# Patient Record
Sex: FEMALE | Race: WHITE | NOT HISPANIC OR LATINO | Employment: OTHER | URBAN - METROPOLITAN AREA
[De-identification: names, ages, dates, MRNs, and addresses within clinical notes are randomized per-mention and may not be internally consistent; named-entity substitution may affect disease eponyms.]

---

## 2017-05-18 ENCOUNTER — COMMUNICATION - HEALTHEAST (OUTPATIENT)
Dept: INTERNAL MEDICINE | Facility: CLINIC | Age: 62
End: 2017-05-18

## 2017-05-23 ENCOUNTER — HOSPITAL ENCOUNTER (OUTPATIENT)
Dept: MAMMOGRAPHY | Facility: CLINIC | Age: 62
Discharge: HOME | End: 2017-05-23
Attending: INTERNAL MEDICINE

## 2017-05-23 DIAGNOSIS — Z12.31 VISIT FOR SCREENING MAMMOGRAM: ICD-10-CM

## 2017-06-01 ENCOUNTER — OFFICE VISIT - HEALTHEAST (OUTPATIENT)
Dept: INTERNAL MEDICINE | Facility: CLINIC | Age: 62
End: 2017-06-01

## 2017-06-01 DIAGNOSIS — Z01.810 PREOP CARDIOVASCULAR EXAM: ICD-10-CM

## 2017-06-01 DIAGNOSIS — M21.619 BUNION: ICD-10-CM

## 2017-06-01 DIAGNOSIS — D68.51 FACTOR V LEIDEN CARRIER (H): ICD-10-CM

## 2017-06-01 ASSESSMENT — MIFFLIN-ST. JEOR: SCORE: 1371.71

## 2017-06-06 LAB
ATRIAL RATE - MUSE: 61 BPM
DIASTOLIC BLOOD PRESSURE - MUSE: NORMAL MMHG
INTERPRETATION ECG - MUSE: NORMAL
P AXIS - MUSE: 54 DEGREES
PR INTERVAL - MUSE: 146 MS
QRS DURATION - MUSE: 88 MS
QT - MUSE: 434 MS
QTC - MUSE: 436 MS
R AXIS - MUSE: 72 DEGREES
SYSTOLIC BLOOD PRESSURE - MUSE: NORMAL MMHG
T AXIS - MUSE: 42 DEGREES
VENTRICULAR RATE- MUSE: 61 BPM

## 2017-08-24 ENCOUNTER — RECORDS - HEALTHEAST (OUTPATIENT)
Dept: ADMINISTRATIVE | Facility: OTHER | Age: 62
End: 2017-08-24

## 2017-09-13 ENCOUNTER — OFFICE VISIT - HEALTHEAST (OUTPATIENT)
Dept: INTERNAL MEDICINE | Facility: CLINIC | Age: 62
End: 2017-09-13

## 2017-09-13 DIAGNOSIS — Z13.220 SCREENING FOR CHOLESTEROL LEVEL: ICD-10-CM

## 2017-09-13 DIAGNOSIS — Z00.00 ROUTINE GENERAL MEDICAL EXAMINATION AT A HEALTH CARE FACILITY: ICD-10-CM

## 2017-09-13 DIAGNOSIS — E66.3 OVERWEIGHT (BMI 25.0-29.9): ICD-10-CM

## 2017-09-13 DIAGNOSIS — E04.1 RIGHT THYROID NODULE: ICD-10-CM

## 2017-09-13 LAB
CHOLEST SERPL-MCNC: 198 MG/DL
FASTING STATUS PATIENT QL REPORTED: YES
HDLC SERPL-MCNC: 73 MG/DL
LDLC SERPL CALC-MCNC: 114 MG/DL
TRIGL SERPL-MCNC: 55 MG/DL

## 2017-09-13 RX ORDER — TRIAMCINOLONE ACETONIDE 1 MG/G
OINTMENT TOPICAL 2 TIMES DAILY
Status: SHIPPED | COMMUNITY
Start: 2017-09-13

## 2017-09-13 ASSESSMENT — MIFFLIN-ST. JEOR: SCORE: 1389.57

## 2017-09-18 ENCOUNTER — AMBULATORY - HEALTHEAST (OUTPATIENT)
Dept: INTERNAL MEDICINE | Facility: CLINIC | Age: 62
End: 2017-09-18

## 2017-09-18 ENCOUNTER — HOSPITAL ENCOUNTER (OUTPATIENT)
Dept: ULTRASOUND IMAGING | Facility: CLINIC | Age: 62
Discharge: HOME | End: 2017-09-18
Attending: INTERNAL MEDICINE

## 2017-09-18 DIAGNOSIS — E04.1 RIGHT THYROID NODULE: ICD-10-CM

## 2017-09-19 ENCOUNTER — COMMUNICATION - HEALTHEAST (OUTPATIENT)
Dept: INTERNAL MEDICINE | Facility: CLINIC | Age: 62
End: 2017-09-19

## 2017-09-19 ENCOUNTER — RECORDS - HEALTHEAST (OUTPATIENT)
Dept: ADMINISTRATIVE | Facility: OTHER | Age: 62
End: 2017-09-19

## 2017-09-20 ENCOUNTER — RECORDS - HEALTHEAST (OUTPATIENT)
Dept: ADMINISTRATIVE | Facility: OTHER | Age: 62
End: 2017-09-20

## 2017-09-28 ENCOUNTER — HOSPITAL ENCOUNTER (OUTPATIENT)
Dept: ULTRASOUND IMAGING | Facility: CLINIC | Age: 62
Discharge: HOME | End: 2017-09-28
Attending: INTERNAL MEDICINE

## 2017-09-28 DIAGNOSIS — E04.1 RIGHT THYROID NODULE: ICD-10-CM

## 2017-09-29 LAB
LAB AP CHARGES (HE HISTORICAL CONVERSION): NORMAL
LAB AP INITIAL CYTO EVAL (HE HISTORICAL CONVERSION): NORMAL
LAB MED GENERAL PATH INTERP (HE HISTORICAL CONVERSION): NORMAL
PATH REPORT.COMMENTS IMP SPEC: NORMAL
PATH REPORT.COMMENTS IMP SPEC: NORMAL
PATH REPORT.FINAL DX SPEC: NORMAL
PATH REPORT.MICROSCOPIC SPEC OTHER STN: NORMAL
PATH REPORT.RELEVANT HX SPEC: NORMAL
RESULT FLAG (HE HISTORICAL CONVERSION): NORMAL
SPECIMEN DESCRIPTION: NORMAL

## 2017-10-18 ENCOUNTER — OFFICE VISIT - HEALTHEAST (OUTPATIENT)
Dept: INTERNAL MEDICINE | Facility: CLINIC | Age: 62
End: 2017-10-18

## 2017-10-18 ENCOUNTER — COMMUNICATION - HEALTHEAST (OUTPATIENT)
Dept: SCHEDULING | Facility: CLINIC | Age: 62
End: 2017-10-18

## 2017-10-18 DIAGNOSIS — I49.9 IRREGULAR HEART RHYTHM: ICD-10-CM

## 2017-10-18 LAB
ATRIAL RATE - MUSE: 65 BPM
DIASTOLIC BLOOD PRESSURE - MUSE: NORMAL MMHG
INTERPRETATION ECG - MUSE: NORMAL
P AXIS - MUSE: 22 DEGREES
PR INTERVAL - MUSE: 134 MS
QRS DURATION - MUSE: 90 MS
QT - MUSE: 426 MS
QTC - MUSE: 443 MS
R AXIS - MUSE: 62 DEGREES
SYSTOLIC BLOOD PRESSURE - MUSE: NORMAL MMHG
T AXIS - MUSE: 29 DEGREES
VENTRICULAR RATE- MUSE: 65 BPM

## 2017-10-24 ENCOUNTER — HOSPITAL ENCOUNTER (OUTPATIENT)
Dept: CARDIOLOGY | Facility: CLINIC | Age: 62
Discharge: HOME | End: 2017-10-24

## 2017-10-24 DIAGNOSIS — I49.9 IRREGULAR HEART RHYTHM: ICD-10-CM

## 2018-03-26 ENCOUNTER — RECORDS - HEALTHEAST (OUTPATIENT)
Dept: ADMINISTRATIVE | Facility: OTHER | Age: 63
End: 2018-03-26

## 2018-04-05 ENCOUNTER — RECORDS - HEALTHEAST (OUTPATIENT)
Dept: ADMINISTRATIVE | Facility: OTHER | Age: 63
End: 2018-04-05

## 2018-05-15 ENCOUNTER — OFFICE VISIT - HEALTHEAST (OUTPATIENT)
Dept: INTERNAL MEDICINE | Facility: CLINIC | Age: 63
End: 2018-05-15

## 2018-05-15 DIAGNOSIS — M21.619 BUNION: ICD-10-CM

## 2018-05-15 DIAGNOSIS — D68.51 FACTOR V LEIDEN CARRIER (H): ICD-10-CM

## 2018-05-15 DIAGNOSIS — Z01.818 PREOP EXAMINATION: ICD-10-CM

## 2018-05-15 LAB
ATRIAL RATE - MUSE: 53 BPM
DIASTOLIC BLOOD PRESSURE - MUSE: NORMAL MMHG
INTERPRETATION ECG - MUSE: NORMAL
P AXIS - MUSE: 55 DEGREES
PR INTERVAL - MUSE: 144 MS
QRS DURATION - MUSE: 88 MS
QT - MUSE: 438 MS
QTC - MUSE: 410 MS
R AXIS - MUSE: 68 DEGREES
SYSTOLIC BLOOD PRESSURE - MUSE: NORMAL MMHG
T AXIS - MUSE: 41 DEGREES
VENTRICULAR RATE- MUSE: 53 BPM

## 2018-06-04 ENCOUNTER — HOSPITAL ENCOUNTER (OUTPATIENT)
Dept: MAMMOGRAPHY | Facility: CLINIC | Age: 63
Discharge: HOME | End: 2018-06-04
Attending: INTERNAL MEDICINE

## 2018-06-04 DIAGNOSIS — Z12.31 VISIT FOR SCREENING MAMMOGRAM: ICD-10-CM

## 2018-06-26 ENCOUNTER — RECORDS - HEALTHEAST (OUTPATIENT)
Dept: ADMINISTRATIVE | Facility: OTHER | Age: 63
End: 2018-06-26

## 2019-06-26 ENCOUNTER — RECORDS - HEALTHEAST (OUTPATIENT)
Dept: ADMINISTRATIVE | Facility: OTHER | Age: 64
End: 2019-06-26

## 2019-08-14 ENCOUNTER — HOSPITAL ENCOUNTER (OUTPATIENT)
Dept: MAMMOGRAPHY | Facility: CLINIC | Age: 64
Discharge: HOME | End: 2019-08-14
Attending: OBSTETRICS & GYNECOLOGY

## 2019-08-14 DIAGNOSIS — Z12.31 VISIT FOR SCREENING MAMMOGRAM: ICD-10-CM

## 2019-09-04 ENCOUNTER — RECORDS - HEALTHEAST (OUTPATIENT)
Dept: BONE DENSITY | Facility: CLINIC | Age: 64
End: 2019-09-04

## 2019-09-04 ENCOUNTER — RECORDS - HEALTHEAST (OUTPATIENT)
Dept: ADMINISTRATIVE | Facility: OTHER | Age: 64
End: 2019-09-04

## 2019-09-04 DIAGNOSIS — Z78.0 ASYMPTOMATIC MENOPAUSAL STATE: ICD-10-CM

## 2020-10-08 ENCOUNTER — HOSPITAL ENCOUNTER (OUTPATIENT)
Dept: MAMMOGRAPHY | Facility: CLINIC | Age: 65
Discharge: HOME | End: 2020-10-08
Attending: OBSTETRICS & GYNECOLOGY

## 2020-10-08 DIAGNOSIS — Z12.31 VISIT FOR SCREENING MAMMOGRAM: ICD-10-CM

## 2021-05-25 ENCOUNTER — RECORDS - HEALTHEAST (OUTPATIENT)
Dept: ADMINISTRATIVE | Facility: CLINIC | Age: 66
End: 2021-05-25

## 2021-05-26 ENCOUNTER — RECORDS - HEALTHEAST (OUTPATIENT)
Dept: ADMINISTRATIVE | Facility: CLINIC | Age: 66
End: 2021-05-26

## 2021-05-27 ENCOUNTER — RECORDS - HEALTHEAST (OUTPATIENT)
Dept: ADMINISTRATIVE | Facility: CLINIC | Age: 66
End: 2021-05-27

## 2021-05-28 ENCOUNTER — RECORDS - HEALTHEAST (OUTPATIENT)
Dept: ADMINISTRATIVE | Facility: CLINIC | Age: 66
End: 2021-05-28

## 2021-05-29 ENCOUNTER — RECORDS - HEALTHEAST (OUTPATIENT)
Dept: ADMINISTRATIVE | Facility: CLINIC | Age: 66
End: 2021-05-29

## 2021-05-30 ENCOUNTER — RECORDS - HEALTHEAST (OUTPATIENT)
Dept: ADMINISTRATIVE | Facility: CLINIC | Age: 66
End: 2021-05-30

## 2021-05-31 VITALS — WEIGHT: 179 LBS | BODY MASS INDEX: 28.04 KG/M2

## 2021-05-31 VITALS — BODY MASS INDEX: 28.09 KG/M2 | HEIGHT: 67 IN | WEIGHT: 179 LBS

## 2021-05-31 VITALS — BODY MASS INDEX: 27.48 KG/M2 | WEIGHT: 175.06 LBS | HEIGHT: 67 IN

## 2021-06-01 VITALS — BODY MASS INDEX: 26.47 KG/M2 | WEIGHT: 169 LBS

## 2021-06-11 NOTE — PROGRESS NOTES
Assessment/Plan:      Visit for Preoperative Exam.     Patient approved for surgery with general or local anesthesia.     Subjective:     Scheduled Procedure: Bilateral bunion repair  Surgery Date:  6/5/17  Surgery Location:  [facility]  Surgeon:  Dr. Reeder    ASSESSED PROBLEMS:  Problem List Items Addressed This Visit     None      Visit Diagnoses     Preop cardiovascular exam    -  Primary    Relevant Orders    Electrocardiogram Perform and Read (Completed)          CHIEF COMPLAINT:  Chief Complaint   Patient presents with     Pre-op Exam       HISTORY OF PRESENT ILLNESS:  Coleen Cabrera is a 61 y.o. female here for an internal medicine pre-operative consultation. The exam is requested by Dr. Reeder  in preparation for Bilateral Bunion Repair to be performed at [facility] on 6/5/17. Today s examination on 6/1/17 is done to review the underlying surgical condition of Bunions, clear for anesthesia, and review medical problems with appropriate changes in medications. The bunions are only painful if she is on her feet a lot or is playing tennis. She's had them for at least two years.  She has had various friends that have gotten the surgery with this surgeon.    Coleen Cabrera has tolerated previous surgeries well without bleeding or anesthesia difficulty. She had gallbladder surgery with no complications. She has had an appendectomy. She denies any blood clots with any of her surgeries.  .............................................................................................................................................  Current Outpatient Prescriptions   Medication Sig Dispense Refill     aspirin 81 MG EC tablet Take 81 mg by mouth daily.       b complex vitamins tablet Take 1 tablet by mouth daily.       cholecalciferol, vitamin D3, (VITAMIN D3) 2,000 unit cap Take by mouth.       FOLIC ACID ORAL Take 2 mg by mouth.       pyridoxine (VITAMIN B-6) 50 MG tablet Take 50 mg by mouth  daily.       HYDROcodone-acetaminophen 5-325 mg per tablet Take 1-2 tablets by mouth every 4 (four) hours as needed for pain. 25 tablet 0     No current facility-administered medications for this visit.        No Known Allergies    Immunization History   Administered Date(s) Administered     Td, historic 1955     Tdap 11/14/2013       Patient Active Problem List   Diagnosis     Factor V Leiden carrier     Right nephrolithiasis       No past medical history on file.    Social History     Social History     Marital status:      Spouse name: N/A     Number of children: N/A     Years of education: N/A     Occupational History     book keeping      Part time     Social History Main Topics     Smoking status: Never Smoker     Smokeless tobacco: Never Used     Alcohol use 1.5 oz/week     3 Standard drinks or equivalent per week     Drug use: Not on file     Sexual activity: Not on file     Other Topics Concern     Not on file     Social History Narrative       Past Surgical History:   Procedure Laterality Date     APPENDECTOMY  7/27/1981     HYSTEROSCOPY  10/5/2010     KIDNEY STONE SURGERY  2012    Stent, [facility]         History of Present Illness  Recent Health  Fever: no  Chills: no  Fatigue: no  Chest Pain: no  Cough: no  Dyspnea: no  Urinary Frequency: no  Nausea: no  Vomiting: no  Diarrhea: no  Abdominal Pain: no  Easy Bruising: no  Lower Extremity Swelling: no  Poor Exercise Tolerance: no    Most recent Health Maintenance Visit:  1 year(s) ago    Pertinent History  Prior Anesthesia: yes  Previous Anesthesia Reaction:  no  Diabetes: no  Cardiovascular Disease: no  Pulmonary Disease: no  Renal Disease: no  GI Disease: no  Sleep Apnea: no  Thromboembolic Problems: no  Clotting Disorder: no  Bleeding Disorder: no  Transfusion Reaction: no  Impaired Immunity: no  Steroid use in the last 6 months: yes, psoriasis cream   Frequent Aspirin use: yes, Stopped already    Family history of Stroke    Social  history of patient does not wear denture or partial plates, there is no transfusion refusal and there are no concerns regarding care after surgery    After surgery, the patient plans to recover at home with family.    Review of Systems  She has had a colonoscopy. She denies chest discomfort or shortness of breath even with activity. She is not currently taking any of her vitamin supplements. She feels well overall. Pertinent items are noted in HPI. A 12 point comprehensive review of systems was negative except as noted.      Objective:      Vitals:    06/01/17 1112   BP: 128/72   Pulse: 60   SpO2: 100%        Physical Exam:  General Appearance: Alert, cooperative, no distress, appears stated age   Head: Normocephalic, without obvious abnormality, atraumatic   Eyes: PERRL, conjunctiva/corneas clear, EOM's intact   Throat: Lips, mucosa, and tongue normal; teeth and gums normal   Neck: Normal ROM   Lungs: Clear to auscultation bilaterally, respirations unlabored.   Heart: Regular rate and rhythm, S1 and S2 normal, no murmur, rub, or gallop,   Abdomen: Soft, non-tender, bowel sounds active all four quadrants, no masses, no organomegaly   Extremities: Extremities normal, atraumatic, no cyanosis or edema, bunion deformities bilaterally   Skin: Skin color, texture, turgor normal, no rashes or lesions   Neurologic: Normal     EKG 6/1/17: Normal sinus rhythm, rate 61,     ADDITIONAL HISTORY SUMMARIZED (2): Reviewed note from 7/14/15 regarding shortness of breath upon exertion.   DECISION TO OBTAIN EXTRA INFORMATION (1): None.   RADIOLOGY TESTS (1): None.  LABS (1): Reviewed labs from 7/14/15.   MEDICINE TESTS (1): Ordered EKG.  INDEPENDENT REVIEW (2 each): Reviewed EKG from 6/1/17.     The visit lasted a total of 10 minutes face to face with the patient. Over 50% of the time was spent counseling and educating the patient about bunions.    ILaura, am scribing for and in the presence of, Juana Urrutia MD.    I,  Juana Urrutia MD, personally performed the services described in this documentation, as scribed by Laura Ro in my presence, and it is both accurate and complete.    Total Points: 6

## 2021-06-12 NOTE — PROGRESS NOTES
ASSESSMENT and PLAN:  1. Right thyroid nodule  We will check a thyroid function today as well as order a thyroid ultrasound.  - US Thyroid; Future  - Thyroid [place]    2. Screening for cholesterol level  Labs today.  - Lipid Cascade    3. Overweight (BMI 25.0-29.9)  Will get a conference of metabolic panel as part of her physical given that she is overweight.  I am checking a glucose with this.  - Comprehensive Metabolic Panel    4.  Annual exam  Her cancer screenings are up-to-date.      Patient Instructions   Please set up your thyroid ultrasound:  [phone]    Today's labs will be available on [employer].  If you aren't signed up, then we'll mail them out.  If anything needs more immediate follow up, we'll also call.    Take care!      Orders Placed This Encounter   Procedures     US Thyroid     Standing Status:   Future     Number of Occurrences:   1     Standing Expiration Date:   9/13/2018     Order Specific Question:   Reason for Exam (Describe Symptoms):     Answer:   ?right sided enlargement     Order Specific Question:   Can the procedure be changed per Radiologist protocol?     Answer:   Yes     Influenza, Seasonal,Quad Inj, 36+ MOS     Thyroid [place]     Comprehensive Metabolic Panel     Lipid Cascade     Order Specific Question:   Fasting is required?     Answer:   Yes     Medications Discontinued During This Encounter   Medication Reason     HYDROcodone-acetaminophen 5-325 mg per tablet Therapy completed       No Follow-up on file.    ASSESSED PROBLEMS:  Problem List Items Addressed This Visit     None      Visit Diagnoses     Routine general medical examination at a health care facility    -  Primary    Right thyroid nodule        Relevant Orders    US Thyroid (Completed)    Thyroid Cascade (Completed)    Screening for cholesterol level        Relevant Orders    Lipid Cascade (Completed)    Overweight (BMI 25.0-29.9)        Relevant Orders    Comprehensive Metabolic Panel (Completed)          CHIEF  COMPLAINT:  Chief Complaint   Patient presents with     Annual Exam     pt is fasting, no pap/pelvic        HISTORY OF PRESENT ILLNESS:  Coleen Cabrera is a 62 y.o. female is presenting to the clinic today for an annual exam.  She spent most of the summer recovering from her bilateral bunion repair that she had done in June.    She is dealing with some stress with her extended family.  As a result of that, she has had some changes in her bowel habits.  It seems to directly correlate with times that she is under stress.  She saw [facility] for this in August of this year.  She is scheduled for colonoscopy next year.    She saw dermatology for a rash on her right breast.  It was biopsied and was found to be benign.    Her weight is up about 4 pounds over the summer.  She attributes that to a change in her activity level.    Health Maintenance:  She has her eyes examined regularly and visits the dentist on a regular basis.   Mammogram:5/23/17  Colonoscopy: 2015  Pap Smear: 2015 by gyn (Shaneka)    REVIEW OF SYSTEMS:   She denies nipple discharge and drainage. GI: distress from spicy or fatty food, change in bowel habits   All other systems are negative.    PFSH:  No past medical history on file.  Past Surgical History:   Procedure Laterality Date     APPENDECTOMY  7/27/1981     HYSTEROSCOPY  10/5/2010     KIDNEY STONE SURGERY  2012    Stent, [facility]     Family History   Problem Relation Age of Onset     Rheum arthritis Mother      Coronary artery disease Maternal Grandfather      Social History     Social History     Marital status:      Spouse name: N/A     Number of children: N/A     Years of education: N/A     Occupational History     book keeping      Part time     Social History Main Topics     Smoking status: Never Smoker     Smokeless tobacco: Never Used     Alcohol use 1.5 oz/week     3 Standard drinks or equivalent per week     Drug use: Not on file     Sexual activity: Not on file     Other Topics  "Concern     Not on file     Social History Narrative       VITALS:  Vitals:    09/13/17 0958   BP: 118/78   Pulse: 64   SpO2: 98%   Weight: 179 lb (81.2 kg)   Height: 5' 7\" (1.702 m)     Wt Readings from Last 3 Encounters:   09/13/17 179 lb (81.2 kg)   06/01/17 175 lb 1 oz (79.4 kg)   03/29/16 171 lb (77.6 kg)       PHYSICAL EXAM:  Constitutional:  Reveals an alert, pleasant, adult female.   Vitals:  Noted.   Head: Normocephalic, without obvious abnormality, atraumatic   Ears: TM's normal bilaterally   Eyes: PERRL, conjunctiva/corneas clear, EOM's intact   Throat: Lips, mucosa, and tongue normal; teeth and gums normal   Neck: Normal ROM, no carotid bruits, right thyroid nodule  Lungs: Clear to auscultation bilaterally, respirations unlabored.   Breast exam:  Normal skin overlying her breasts bilaterally no discrete nodule is palpable in the axilla bilaterally  Heart: Regular rate and rhythm, S1 and S2 normal, no murmur, rub, or gallop,   Abdomen: Soft, non-tender, bowel sounds active all four quadrants, no masses, no organomegaly   Extremities: Extremities normal, atraumatic, no cyanosis or edema   Pelvic:Not examined  Skin: Skin color, texture, turgor normal, no rashes or lesions   Neurologic: Normal     MEDICATIONS:  Current Outpatient Prescriptions   Medication Sig Dispense Refill     aspirin 81 MG EC tablet Take 81 mg by mouth daily.       b complex vitamins tablet Take 1 tablet by mouth daily.       cholecalciferol, vitamin D3, (VITAMIN D3) 2,000 unit cap Take by mouth.       FOLIC ACID ORAL Take 2 mg by mouth.       hyoscyamine (LEVSIN/SL) 0.125 mg SL tablet TK 1 T SUBLINGUALLY Q 8 H PRN  2     pyridoxine (VITAMIN B-6) 50 MG tablet Take 50 mg by mouth daily.       triamcinolone (KENALOG) 0.1 % ointment Apply topically 2 (two) times a day.       No current facility-administered medications for this visit.            "

## 2021-06-13 NOTE — PROGRESS NOTES
Clinic Note    Assessment:     Assessment and Plan:    1. Irregular heart rhythm    Her EKG looks normal today. I was able to  premature complexes on ausculation today during her exam. I had the patient meet with her PCP briefly today and we discussed having some lab work checked today, along with having her wear a Holter monitor for 24 hours.     - Electrocardiogram Perform and Read  - Magnesium  - Basic Metabolic Panel  - HM2(CBC w/o Differential)  - Thyroid Cascade  - Holter Monitor; Future       Patient Instructions   We will let you know about the lab results via [employer].     We will have you meet with the specialty  to schedule the Holter monitor.                Subjective:      Coleen Cabrera is a 62 y.o. female who is here with heart palpitations. She has had these palpitations for the past ten days. She says that she has worn a Holter monitor in the past and wore it for a couple of weeks but this resulted as normal. She says that the experiences these palpitations when she sits down to read, or when she is driving; she does not notice them during the day when she is active. She says that her daughter is a CRNA and listened to her chest with a stethoscope and told her that she was having PVCs. She denies having chest pain. No shortness of breath. She says that she has a lump on her thyroid that she had biopsied and was told that it was normal. There have been no changes to her medications in the past two weeks. She denies using caffeine. No change in diet in the last two weeks; denies using any herbal supplements. No increased swelling in her lower extremities. No numbness or tingling. No fevers. No flu-like or cold symptoms in the past two weeks. No blood in stools or abnormal vaginal bleeding.     The following portions of the patient's history were reviewed and updated as appropriate: Allergies, Medications, Problem List    Review of Systems:    Review is negative except for what is  mentioned above.     Social Hx:    History   Smoking Status     Never Smoker   Smokeless Tobacco     Never Used         Objective:     Vitals:    10/18/17 1503   BP: 128/88   Patient Site: Left Arm   Patient Position: Sitting   Cuff Size: Adult Regular   Pulse: 81   SpO2: 98%   Weight: 179 lb (81.2 kg)       Exam:    General: No apparent distress. Calm. Alert and Oriented X3. Pt behavior is appropriate.  Head:Atraumatic. Normocephalic, non-tender to palpation  Neck: Supple. No JVD. Full ROM. No adenopathy  Eyes: PERRL, No discharge. No strabismus. No nystagmus.  Ears: TMs pearly gray with landmarks visible.   Nose/Mouth/Throat: Patent nares, no oral lesions, pharynx clear and without exudate. Uvula mid-line. Nasal septum mid-line. Clear turbinates.   Lymph: No axillar or cervical adenopathy.   Chest/Lungs: Normal chest wall, clear to auscultation, normal respiratory effort and rate.   Heart/Pulses: Regular rate and rhythm with infrequent PVCs, strong and equal radial pulses, no murmurs. Capillary refill <2 seconds. No edema.   Musculoskeletal: No CVA tenderness with palpation. Good ROM with extremities.   Neurologic: Interactive, alert, no focal findings, CNs intact.   Skin: Warm, dry. Normal hair pattern. Free of lesions. Normal skin turgor.       Patient Active Problem List   Diagnosis     Factor V Leiden carrier     Right nephrolithiasis     Current Outpatient Prescriptions   Medication Sig Dispense Refill     aspirin 81 MG EC tablet Take 81 mg by mouth daily.       b complex vitamins tablet Take 1 tablet by mouth daily.       cholecalciferol, vitamin D3, (VITAMIN D3) 2,000 unit cap Take by mouth.       FOLIC ACID ORAL Take 2 mg by mouth.       hyoscyamine (LEVSIN/SL) 0.125 mg SL tablet TK 1 T SUBLINGUALLY Q 8 H PRN  2     pyridoxine (VITAMIN B-6) 50 MG tablet Take 50 mg by mouth daily.       triamcinolone (KENALOG) 0.1 % ointment Apply topically 2 (two) times a day.       No current facility-administered  medications for this visit.        Total time spent with patient was 20 minutes with >50% of time spent in face-to-face counseling regarding the above plan       Kvng Haddad CNP (Rob)    10/18/2017

## 2021-06-18 NOTE — PROGRESS NOTES
Preoperative Exam    Scheduled Procedure: Bunionectomy  Surgery Date:  6/13/18  Surgery Location: [facility] [phone]    Surgeon:  Dr. Reeder    Assessment/Plan:     1. Factor V Leiden carrier  She's had no clots and is just a carrier.    2. Preop examination  I find no contraindication to planned procedure.    - Electrocardiogram Perform - Clinic    3. Bunion  These were treated surgically last year w/ good results.  She was advised that she had a bursa develop over the hardware bilaterally.  She is having the hardware removed surgically for treatment.    Surgical Procedure Risk: Low (reported cardiac risk generally < 1%)  Have you had prior anesthesia?: Yes  Have you or any family members had a previous anesthesia reaction:  No  Do you or any family members have a history of a clotting or bleeding disorder?: No  Cardiac Risk Assessment: no increased risk for major cardiac complications    Patient approved for surgery with general or local anesthesia.    Functional Status: Independent  Patient plans to recover at home with family.     Subjective:      Coleen Cabrera is a 62 y.o. female who presents for a preoperative consultation.    She's had bunions for about 3 years.  She had bilateral bunionectomy done last June.  She was very pleased with the results.  Unfortunately, this past February, she started to notice pain and swelling near the surgical sites.  It was more pronounced on the left than the right.  When she followed up with her surgeon in March, he told her that the surgical procedure was still intact and the hardware was in place however, she had developed a swollen bursa over the hardware.  She is now scheduled to have the hardware removed.  She is otherwise feeling well.  She plays tennis without chest pain or shortness of breath.    All other systems reviewed and are negative, other than those listed in the HPI.    Pertinent History  Do you have difficulty breathing or chest pain after walking up a  flight of stairs: No  History of obstructive sleep apnea: No  Steroid use in the last 6 months: Yes: fluocinol oil  Frequent Aspirin/NSAID use: Yes: 81mg aspirin  Prior Blood Transfusion: No  Prior Blood Transfusion Reaction: No  If for some reason prior to, during or after the procedure, if it is medically indicated, would you be willing to have a blood transfusion?:  There is no transfusion refusal.    Current Outpatient Prescriptions   Medication Sig Dispense Refill     aspirin 81 MG EC tablet Take 81 mg by mouth daily.       b complex vitamins tablet Take 1 tablet by mouth daily.       cholecalciferol, vitamin D3, (VITAMIN D3) 2,000 unit cap Take by mouth.       FOLIC ACID ORAL Take 2 mg by mouth.       hyoscyamine (LEVSIN/SL) 0.125 mg SL tablet TK 1 T SUBLINGUALLY Q 8 H PRN  2     triamcinolone (KENALOG) 0.1 % ointment Apply topically 2 (two) times a day.       No current facility-administered medications for this visit.         Allergies   Allergen Reactions     Kiwi Swelling     Mouth swelling          Patient Active Problem List   Diagnosis     Factor V Leiden carrier     Right nephrolithiasis       No past medical history on file.    Past Surgical History:   Procedure Laterality Date     APPENDECTOMY  7/27/1981     HYSTEROSCOPY  10/5/2010     KIDNEY STONE SURGERY  2012    Stent, [facility]       Social History     Social History     Marital status:      Spouse name: N/A     Number of children: N/A     Years of education: N/A     Occupational History     book keeping      Part time     Social History Main Topics     Smoking status: Never Smoker     Smokeless tobacco: Never Used     Alcohol use 1.5 oz/week     3 Standard drinks or equivalent per week     Drug use: Not on file     Sexual activity: Not on file     Other Topics Concern     Not on file     Social History Narrative       Patient Care Team:  Juana Urrutia MD as PCP - General (Internal Medicine)          Objective:     Vitals:     05/15/18 1343   BP: 118/68   Pulse: (!) 58   SpO2: 98%   Weight: 169 lb (76.7 kg)       Physical Exam:  General Appearance: Alert, cooperative, no distress, appears stated age   Head: Normocephalic, without obvious abnormality, atraumatic   Eyes: PERRL, conjunctiva/corneas clear, EOM's intact   Ears: external auditory canals w/ some swelling, TMs are nl, no wax but dry skin noted bilaterally in external canals  Neck: right sided thyroid nodule (no change from prior)  Throat: Lips, mucosa, and tongue normal; teeth and gums normal   Neck: Normal ROM   Lungs: Clear to auscultation bilaterally, respirations unlabored.   Heart: Regular rate and rhythm, S1 and S2 normal, no murmur, rub, or gallop,   Abdomen: Soft, non-tender, bowel sounds active, no masses, no organomegaly   Extremities: Bilateral scars on her feet, consistent with prior bunionectomies  Skin: Skin color, texture, turgor normal, no rashes or lesions   Neurologic: Normal       Patient Instructions   Hold all supplements, aspirin and NSAIDs for 7 days prior to surgery.    Your surgeon will be managing your pain after your surgery.      Remove all jewelry and metal piercings before your surgery.     Take care and safe travels!      EKG:  Sinus bradycardia, rate 53, no acute ST-T wave changes    Labs:  No labs were ordered during this visit    Immunization History   Administered Date(s) Administered     Influenza, seasonal,quad inj 36+ mos 09/13/2017     Td,adult,historic,unspecified 1955     Tdap 11/14/2013           Electronically signed by Juana Urrutia MD 05/15/18 1:39 PM

## 2021-06-26 ENCOUNTER — HEALTH MAINTENANCE LETTER (OUTPATIENT)
Age: 66
End: 2021-06-26

## 2021-07-21 ENCOUNTER — RECORDS - HEALTHEAST (OUTPATIENT)
Dept: ADMINISTRATIVE | Facility: CLINIC | Age: 66
End: 2021-07-21

## 2021-10-16 ENCOUNTER — HEALTH MAINTENANCE LETTER (OUTPATIENT)
Age: 66
End: 2021-10-16

## 2021-10-28 ENCOUNTER — HOSPITAL ENCOUNTER (OUTPATIENT)
Dept: MAMMOGRAPHY | Facility: CLINIC | Age: 66
Discharge: HOME | End: 2021-10-28
Attending: OBSTETRICS & GYNECOLOGY | Admitting: OBSTETRICS & GYNECOLOGY
Payer: COMMERCIAL

## 2021-10-28 DIAGNOSIS — Z12.31 VISIT FOR SCREENING MAMMOGRAM: ICD-10-CM

## 2021-10-28 PROCEDURE — 77067 SCR MAMMO BI INCL CAD: CPT

## 2022-07-23 ENCOUNTER — HEALTH MAINTENANCE LETTER (OUTPATIENT)
Age: 67
End: 2022-07-23

## 2022-10-01 ENCOUNTER — HEALTH MAINTENANCE LETTER (OUTPATIENT)
Age: 67
End: 2022-10-01

## 2022-11-16 ENCOUNTER — HOSPITAL ENCOUNTER (OUTPATIENT)
Dept: MAMMOGRAPHY | Facility: CLINIC | Age: 67
Discharge: HOME | End: 2022-11-16
Attending: INTERNAL MEDICINE | Admitting: INTERNAL MEDICINE
Payer: COMMERCIAL

## 2022-11-16 DIAGNOSIS — Z12.31 VISIT FOR SCREENING MAMMOGRAM: ICD-10-CM

## 2022-11-16 PROCEDURE — 77067 SCR MAMMO BI INCL CAD: CPT

## 2022-12-07 ENCOUNTER — ANCILLARY PROCEDURE (OUTPATIENT)
Dept: BONE DENSITY | Facility: CLINIC | Age: 67
End: 2022-12-07
Attending: INTERNAL MEDICINE
Payer: COMMERCIAL

## 2022-12-07 DIAGNOSIS — Z78.0 MENOPAUSE: ICD-10-CM

## 2022-12-07 PROCEDURE — 77080 DXA BONE DENSITY AXIAL: CPT | Mod: TC | Performed by: RADIOLOGY

## 2023-06-04 ENCOUNTER — HOSPITAL ENCOUNTER (OUTPATIENT)
Dept: MRI IMAGING | Facility: CLINIC | Age: 68
Discharge: HOME | End: 2023-06-04
Attending: OBSTETRICS & GYNECOLOGY | Admitting: OBSTETRICS & GYNECOLOGY
Payer: COMMERCIAL

## 2023-06-04 DIAGNOSIS — N83.8 ENLARGED OVARY: ICD-10-CM

## 2023-06-04 PROCEDURE — 255N000002 HC RX 255 OP 636: Performed by: OBSTETRICS & GYNECOLOGY

## 2023-06-04 PROCEDURE — 72197 MRI PELVIS W/O & W/DYE: CPT

## 2023-06-04 PROCEDURE — A9585 GADOBUTROL INJECTION: HCPCS | Performed by: OBSTETRICS & GYNECOLOGY

## 2023-06-04 RX ORDER — GADOBUTROL 604.72 MG/ML
8.5 INJECTION INTRAVENOUS ONCE
Status: COMPLETED | OUTPATIENT
Start: 2023-06-04 | End: 2023-06-04

## 2023-06-04 RX ADMIN — GADOBUTROL 8.5 ML: 604.72 INJECTION INTRAVENOUS at 13:53

## 2023-11-28 ENCOUNTER — HOSPITAL ENCOUNTER (OUTPATIENT)
Dept: MAMMOGRAPHY | Facility: CLINIC | Age: 68
Discharge: HOME | End: 2023-11-28
Attending: INTERNAL MEDICINE | Admitting: INTERNAL MEDICINE
Payer: MEDICARE

## 2023-11-28 DIAGNOSIS — Z12.31 VISIT FOR SCREENING MAMMOGRAM: ICD-10-CM

## 2023-11-28 PROCEDURE — 77067 SCR MAMMO BI INCL CAD: CPT
